# Patient Record
Sex: MALE | Race: WHITE | NOT HISPANIC OR LATINO | URBAN - METROPOLITAN AREA
[De-identification: names, ages, dates, MRNs, and addresses within clinical notes are randomized per-mention and may not be internally consistent; named-entity substitution may affect disease eponyms.]

---

## 2023-03-27 LAB — PROSTATE SPECIFIC ANTIGEN,SCREEN: 0.54 NG/ML (ref 0–4)

## 2023-04-30 ASSESSMENT — PROMIS GLOBAL HEALTH SCALE
RATE_AVERAGE_PAIN: 1
CARRYOUT_SOCIAL_ACTIVITIES: VERY GOOD
RATE_PHYSICAL_HEALTH: VERY GOOD
RATE_MENTAL_HEALTH: VERY GOOD
RATE_GENERAL_HEALTH: VERY GOOD
EMOTIONAL_PROBLEMS: RARELY
RATE_SOCIAL_SATISFACTION: VERY GOOD
RATE_QUALITY_OF_LIFE: VERY GOOD
CARRYOUT_PHYSICAL_ACTIVITIES: MOSTLY

## 2023-05-03 ASSESSMENT — ENCOUNTER SYMPTOMS
DYSPHORIC MOOD: 0
DYSURIA: 0
CHILLS: 0
SHORTNESS OF BREATH: 0
DIFFICULTY URINATING: 0
DIZZINESS: 0
ABDOMINAL PAIN: 0
FEVER: 0
NERVOUS/ANXIOUS: 0
PALPITATIONS: 0
HEADACHES: 0

## 2023-05-03 NOTE — PROGRESS NOTES
"Subjective   Patient ID: Kevon Schaefer is a 57 y.o. male who presents for Annual Exam.    HPI   Here for wellness exam.  No c/o.     Never had colonoscopy.     Sees Dr Spears yearly and has PSA done there.       Watches diet. Rides bike/roller blades several days per week. Gained some wt since last here but states it is just from this past winter since he wasn't exercise -- restarted lately and will get wt back down.     States he had tdap about 4 years ago when he got a small piece of metal in his finger.     Review of Systems   Constitutional:  Negative for chills and fever.   HENT: Negative.     Respiratory:  Negative for shortness of breath.    Cardiovascular:  Negative for chest pain and palpitations.   Gastrointestinal:  Negative for abdominal pain, blood in stool, constipation, diarrhea and vomiting.   Genitourinary:  Negative for difficulty urinating and dysuria.   Skin:  Negative for rash.   Neurological:  Negative for dizziness and headaches.   Psychiatric/Behavioral:  Negative for dysphoric mood. The patient is not nervous/anxious.      Past Medical History:   Diagnosis Date    BPH (benign prostatic hyperplasia)       Family History   Problem Relation Name Age of Onset    Breast cancer Maternal Grandmother      Coronary artery disease Maternal Grandfather        Social History     Tobacco Use    Smoking status: Never    Smokeless tobacco: Never   Substance Use Topics    Alcohol use: Never    Drug use: Never       Objective   /86   Pulse 82   Temp 36.8 °C (98.2 °F) (Temporal)   Ht 1.689 m (5' 6.5\")   Wt 77.6 kg (171 lb)   SpO2 96%   BMI 27.19 kg/m²     Physical Exam  HENT:      Head: Normocephalic.   Eyes:      General: No scleral icterus.     Extraocular Movements: Extraocular movements intact.      Pupils: Pupils are equal, round, and reactive to light.   Cardiovascular:      Rate and Rhythm: Normal rate and regular rhythm.   Pulmonary:      Effort: Pulmonary effort is normal.      Breath " sounds: Normal breath sounds.   Abdominal:      Palpations: Abdomen is soft. There is no mass.      Tenderness: There is no abdominal tenderness.   Musculoskeletal:      Cervical back: Neck supple.   Lymphadenopathy:      Cervical: No cervical adenopathy.   Skin:     General: Skin is warm and dry.   Neurological:      Mental Status: He is alert.   Psychiatric:         Mood and Affect: Affect normal.         Assessment/Plan   Diagnoses and all orders for this visit:  Routine general medical examination at a health care facility  -     Lipid Panel; Future  -     Comprehensive Metabolic Panel; Future  -     CBC and Auto Differential; Future  -     TSH with reflex to Free T4 if abnormal; Future  Colon cancer screening  -     Referral to General Surgery; Future  Weight gain  -     TSH with reflex to Free T4 if abnormal; Future       Discussed wellness issues. Discussed diet and exercise. Encouraged some wt loss.   Get fasting labs.   Referred to Dr Garcia for colonoscopy.  Continue to see urology yearly.   Discussed getting Shingrix at pharmacy.   Follow up prn.

## 2023-05-04 ENCOUNTER — OFFICE VISIT (OUTPATIENT)
Dept: PRIMARY CARE | Facility: CLINIC | Age: 58
End: 2023-05-04
Payer: COMMERCIAL

## 2023-05-04 VITALS
BODY MASS INDEX: 26.84 KG/M2 | HEART RATE: 82 BPM | DIASTOLIC BLOOD PRESSURE: 86 MMHG | HEIGHT: 67 IN | SYSTOLIC BLOOD PRESSURE: 130 MMHG | OXYGEN SATURATION: 96 % | TEMPERATURE: 98.2 F | WEIGHT: 171 LBS

## 2023-05-04 DIAGNOSIS — Z12.11 COLON CANCER SCREENING: ICD-10-CM

## 2023-05-04 DIAGNOSIS — Z00.00 ROUTINE GENERAL MEDICAL EXAMINATION AT A HEALTH CARE FACILITY: Primary | ICD-10-CM

## 2023-05-04 DIAGNOSIS — R63.5 WEIGHT GAIN: ICD-10-CM

## 2023-05-04 PROCEDURE — 99396 PREV VISIT EST AGE 40-64: CPT | Performed by: PHYSICIAN ASSISTANT

## 2023-05-04 PROCEDURE — 1036F TOBACCO NON-USER: CPT | Performed by: PHYSICIAN ASSISTANT

## 2023-05-04 RX ORDER — MULTIVITAMIN
1 TABLET ORAL DAILY
COMMUNITY

## 2023-05-04 ASSESSMENT — ENCOUNTER SYMPTOMS
DIARRHEA: 0
VOMITING: 0
BLOOD IN STOOL: 0
CONSTIPATION: 0

## 2023-05-06 ENCOUNTER — LAB (OUTPATIENT)
Dept: LAB | Facility: LAB | Age: 58
End: 2023-05-06
Payer: COMMERCIAL

## 2023-05-06 DIAGNOSIS — Z00.00 ROUTINE GENERAL MEDICAL EXAMINATION AT A HEALTH CARE FACILITY: ICD-10-CM

## 2023-05-06 DIAGNOSIS — R63.5 WEIGHT GAIN: ICD-10-CM

## 2023-05-06 LAB
ALANINE AMINOTRANSFERASE (SGPT) (U/L) IN SER/PLAS: 17 U/L (ref 10–52)
ALBUMIN (G/DL) IN SER/PLAS: 4.3 G/DL (ref 3.4–5)
ALKALINE PHOSPHATASE (U/L) IN SER/PLAS: 66 U/L (ref 33–120)
ANION GAP IN SER/PLAS: 11 MMOL/L (ref 10–20)
ASPARTATE AMINOTRANSFERASE (SGOT) (U/L) IN SER/PLAS: 17 U/L (ref 9–39)
BASOPHILS (10*3/UL) IN BLOOD BY AUTOMATED COUNT: 0.05 X10E9/L (ref 0–0.1)
BASOPHILS/100 LEUKOCYTES IN BLOOD BY AUTOMATED COUNT: 0.9 % (ref 0–2)
BILIRUBIN TOTAL (MG/DL) IN SER/PLAS: 1.4 MG/DL (ref 0–1.2)
CALCIUM (MG/DL) IN SER/PLAS: 9.2 MG/DL (ref 8.6–10.3)
CARBON DIOXIDE, TOTAL (MMOL/L) IN SER/PLAS: 29 MMOL/L (ref 21–32)
CHLORIDE (MMOL/L) IN SER/PLAS: 104 MMOL/L (ref 98–107)
CHOLESTEROL (MG/DL) IN SER/PLAS: 163 MG/DL (ref 0–199)
CHOLESTEROL IN HDL (MG/DL) IN SER/PLAS: 53.4 MG/DL
CHOLESTEROL/HDL RATIO: 3.1
CREATININE (MG/DL) IN SER/PLAS: 0.79 MG/DL (ref 0.5–1.3)
EOSINOPHILS (10*3/UL) IN BLOOD BY AUTOMATED COUNT: 0.1 X10E9/L (ref 0–0.7)
EOSINOPHILS/100 LEUKOCYTES IN BLOOD BY AUTOMATED COUNT: 1.8 % (ref 0–6)
ERYTHROCYTE DISTRIBUTION WIDTH (RATIO) BY AUTOMATED COUNT: 12.3 % (ref 11.5–14.5)
ERYTHROCYTE MEAN CORPUSCULAR HEMOGLOBIN CONCENTRATION (G/DL) BY AUTOMATED: 33 G/DL (ref 32–36)
ERYTHROCYTE MEAN CORPUSCULAR VOLUME (FL) BY AUTOMATED COUNT: 90 FL (ref 80–100)
ERYTHROCYTES (10*6/UL) IN BLOOD BY AUTOMATED COUNT: 5.21 X10E12/L (ref 4.5–5.9)
GFR MALE: >90 ML/MIN/1.73M2
GLUCOSE (MG/DL) IN SER/PLAS: 89 MG/DL (ref 74–99)
HEMATOCRIT (%) IN BLOOD BY AUTOMATED COUNT: 47 % (ref 41–52)
HEMOGLOBIN (G/DL) IN BLOOD: 15.5 G/DL (ref 13.5–17.5)
IMMATURE GRANULOCYTES/100 LEUKOCYTES IN BLOOD BY AUTOMATED COUNT: 0.2 % (ref 0–0.9)
LDL: 101 MG/DL (ref 0–99)
LEUKOCYTES (10*3/UL) IN BLOOD BY AUTOMATED COUNT: 5.6 X10E9/L (ref 4.4–11.3)
LYMPHOCYTES (10*3/UL) IN BLOOD BY AUTOMATED COUNT: 2.01 X10E9/L (ref 1.2–4.8)
LYMPHOCYTES/100 LEUKOCYTES IN BLOOD BY AUTOMATED COUNT: 36 % (ref 13–44)
MONOCYTES (10*3/UL) IN BLOOD BY AUTOMATED COUNT: 0.41 X10E9/L (ref 0.1–1)
MONOCYTES/100 LEUKOCYTES IN BLOOD BY AUTOMATED COUNT: 7.3 % (ref 2–10)
NEUTROPHILS (10*3/UL) IN BLOOD BY AUTOMATED COUNT: 3.01 X10E9/L (ref 1.2–7.7)
NEUTROPHILS/100 LEUKOCYTES IN BLOOD BY AUTOMATED COUNT: 53.8 % (ref 40–80)
PLATELETS (10*3/UL) IN BLOOD AUTOMATED COUNT: 234 X10E9/L (ref 150–450)
POTASSIUM (MMOL/L) IN SER/PLAS: 4 MMOL/L (ref 3.5–5.3)
PROTEIN TOTAL: 6.5 G/DL (ref 6.4–8.2)
SODIUM (MMOL/L) IN SER/PLAS: 140 MMOL/L (ref 136–145)
THYROTROPIN (MIU/L) IN SER/PLAS BY DETECTION LIMIT <= 0.05 MIU/L: 2.13 MIU/L (ref 0.44–3.98)
TRIGLYCERIDE (MG/DL) IN SER/PLAS: 44 MG/DL (ref 0–149)
UREA NITROGEN (MG/DL) IN SER/PLAS: 17 MG/DL (ref 6–23)
VLDL: 9 MG/DL (ref 0–40)

## 2023-05-06 PROCEDURE — 80061 LIPID PANEL: CPT

## 2023-05-06 PROCEDURE — 36415 COLL VENOUS BLD VENIPUNCTURE: CPT

## 2023-05-06 PROCEDURE — 80053 COMPREHEN METABOLIC PANEL: CPT

## 2023-05-06 PROCEDURE — 84443 ASSAY THYROID STIM HORMONE: CPT

## 2023-05-06 PROCEDURE — 85025 COMPLETE CBC W/AUTO DIFF WBC: CPT

## 2023-05-08 ENCOUNTER — TELEPHONE (OUTPATIENT)
Dept: PRIMARY CARE | Facility: CLINIC | Age: 58
End: 2023-05-08

## 2023-05-08 NOTE — TELEPHONE ENCOUNTER
----- Message from Malcolm Wilson PA-C sent at 5/8/2023  7:33 AM EDT -----  Inform patient that his labs are all okay including his cholesterol levels and his sugar level.  Thyroid level normal.

## 2023-06-06 ENCOUNTER — HOSPITAL ENCOUNTER (OUTPATIENT)
Dept: DATA CONVERSION | Facility: HOSPITAL | Age: 58
End: 2023-06-06
Attending: SURGERY | Admitting: SURGERY
Payer: COMMERCIAL

## 2023-06-06 DIAGNOSIS — D12.0 BENIGN NEOPLASM OF CECUM: ICD-10-CM

## 2023-06-06 DIAGNOSIS — Z12.11 ENCOUNTER FOR SCREENING FOR MALIGNANT NEOPLASM OF COLON: ICD-10-CM

## 2023-06-13 LAB
COMPLETE PATHOLOGY REPORT: NORMAL
CONVERTED CLINICAL DIAGNOSIS-HISTORY: NORMAL
CONVERTED FINAL DIAGNOSIS: NORMAL
CONVERTED FINAL REPORT PDF LINK TO COPY AND PASTE: NORMAL
CONVERTED GROSS DESCRIPTION: NORMAL

## 2024-03-28 ENCOUNTER — LAB (OUTPATIENT)
Dept: LAB | Facility: LAB | Age: 59
End: 2024-03-28
Payer: COMMERCIAL

## 2024-03-28 DIAGNOSIS — N40.0 BENIGN PROSTATIC HYPERPLASIA WITHOUT LOWER URINARY TRACT SYMPTOMS: Primary | ICD-10-CM

## 2024-03-28 DIAGNOSIS — Z12.5 SCREENING PSA (PROSTATE SPECIFIC ANTIGEN): Primary | ICD-10-CM

## 2024-03-28 DIAGNOSIS — N40.0 BENIGN PROSTATIC HYPERPLASIA WITHOUT LOWER URINARY TRACT SYMPTOMS: ICD-10-CM

## 2024-03-28 LAB — PSA SERPL-MCNC: 0.61 NG/ML

## 2024-03-28 PROCEDURE — 36415 COLL VENOUS BLD VENIPUNCTURE: CPT

## 2024-03-28 PROCEDURE — 84153 ASSAY OF PSA TOTAL: CPT

## 2024-04-05 ENCOUNTER — OFFICE VISIT (OUTPATIENT)
Dept: UROLOGY | Facility: CLINIC | Age: 59
End: 2024-04-05
Payer: COMMERCIAL

## 2024-04-05 DIAGNOSIS — N40.0 BENIGN PROSTATIC HYPERPLASIA WITHOUT LOWER URINARY TRACT SYMPTOMS: ICD-10-CM

## 2024-04-05 DIAGNOSIS — Z12.5 SCREENING PSA (PROSTATE SPECIFIC ANTIGEN): Primary | ICD-10-CM

## 2024-04-05 LAB
POC BILIRUBIN, URINE: NEGATIVE
POC BLOOD, URINE: NEGATIVE
POC GLUCOSE, URINE: NEGATIVE MG/DL
POC KETONES, URINE: NEGATIVE MG/DL
POC LEUKOCYTES, URINE: NEGATIVE
POC NITRITE,URINE: NEGATIVE
POC PH, URINE: 7 PH
POC PROTEIN, URINE: NEGATIVE MG/DL
POC SPECIFIC GRAVITY, URINE: 1.02
POC UROBILINOGEN, URINE: 0.2 EU/DL

## 2024-04-05 PROCEDURE — 99213 OFFICE O/P EST LOW 20 MIN: CPT | Performed by: UROLOGY

## 2024-04-05 PROCEDURE — 81003 URINALYSIS AUTO W/O SCOPE: CPT | Performed by: UROLOGY

## 2024-04-05 NOTE — PROGRESS NOTES
04/05/2024  Voiding well, nocturia 1-2    Patient has no nausea, no vomiting, no fever.    JULIAN: Deferred    PSA normal 0.61    We discussed the benign prostate hypertrophy with mild voiding symptom  We discussed PSA screening normal PSA  We discussed a yearly follow-up with PCP  All the questions were answered, the patient expressed understanding and agreed to the plan.    Impression  BPH  Nocturia  PSA check  History of epididymitis     Plan  Yearly follow-up with PCP  cut Back liquid intake  Call if problem       Chief Complaint   Patient presents with    Benign Prostatic Hypertrophy     Patient here for yearly check. Voiding well. Good urinary stream. Nocturia 1-2x; does not restrict fluid intake.         Physical Exam     TODAYS LAB RESULTS:    PSA 03/28/2024  0.61    POC Glucose, Urine  NEGATIVE mg/dl NEGATIVE   POC Bilirubin, Urine  NEGATIVE NEGATIVE   POC Ketones, Urine  NEGATIVE mg/dl NEGATIVE   POC Specific Gravity, Urine  1.005 - 1.035 1.025   POC Blood, Urine  NEGATIVE NEGATIVE   POC PH, Urine  No Reference Range Established PH 7.0   POC Protein, Urine  NEGATIVE, 30 (1+) mg/dl NEGATIVE   POC Urobilinogen, Urine  0.2, 1.0 EU/DL 0.2   Poc Nitrite, Urine  NEGATIVE NEGATIVE   POC Leukocytes, Urine  NEGATIVE NEGATIVE     ASSESSMENT&PLAN:      IMPRESSIONS:     03/31/2023  Voiding well, complaint right testicular numbness intermittently     Patient has no nausea, no vomiting, no fever.     Exam: Circumcised normal penis, testes unremarkable bilaterally, no tenderness all lump on the right     JULIAN: 1+, no nodule     Patient here today for year check of BPH, epididymoorchitis. Patient was to cutback on liquid intake after 6pm.   PSA 3/27/23 0.54  Patient states he is having numbness again in right testicle, noticed about 3 months ago. States it starts when he goes to sleep and resolves when he wakes in the morning.   Denies dysuria, burning, hematuria. Nocturia x1, but he does not stop drinking liquids until  around 10pm.   -JMorgenstern CMA      IO Glucose - Urine Negative REQUIRED    IO Bilirubin Negative REQUIRED    IO Ketones Negative REQUIRED    IO Specific Gravity 1.030 REQUIRED    IO Blood Negative REQUIRED    IO pH 5.5 REQUIRED    IO Protein, Urine Negative REQUIRED    IO Urobilinogen Normal (0.2-1.0 mg/dl) REQUIRED    IO Nitrite, Urine Negative REQUIRED    IO Leukocytes Negative      We discussed benign prostate hypertrophy with mild voiding symptoms, cut back liquid intake after 6 PM  We discussed yearly PSA check  We discussed results of exam of bilateral testes, possible scrotal ultrasound, patient declined  All the questions were answered, the patient expressed understanding and agreed to the plan.     Impression  BPH  Nocturia  PSA check  History of epididymitis     Plan  Conservative management for BPH  cut Back liquid intake  Yearly JULIAN and PSA        03/25/2022  Voiding well, nocturia x2, does not bother; no scrotal pain     Patient has no nausea, no vomiting, no fever.     JULIAN: 1+, no nodule     PSA pending     We discussed benign prostate hypertrophy with mild voiding symptom  We discussed cut back liquid intake after 6 PM  We discussed PSA screening  All the questions were answered, the patient expressed understanding and agreed to the plan.     Nocturia  PSA check     Plan  Conservative management for BPH  cut Back liquid intake after 6 PM  PSA now and in 1 year  Appointment in 1 year        03/26/2021  Voiding well, nocturia 1-2     Patient has no nausea, no vomiting, no fever.     JULIAN: Deferred     PSA  3/17/21 0.64.     IO UA (automated w/o microscopy)           26Mar2021 07:20AM          Kevon Spears     Test Name       Result     Flag        Reference  IO Glucose - Urine         Negative                  IO Bilirubin       Negative                  IO Ketones      Trace                       IO Specific Gravity         1.025                       IO Blood          Negative                  IO pH                5.5                           IO Protein, Urine            Negative                  IO Urobilinogen                                              Normal (0.2-1.0 mg/dl)  IO Nitrite, Urine              Negative                  IO Leukocytes Negative                  IO Glucose - Urine         Negative                  IO Bilirubin       Negative                  IO Ketones      Trace                       IO Specific Gravity         1.025                       IO Blood          Negative                  IO pH               5.5                           IO Protein, Urine            Negative                  IO Urobilinogen                                              Normal (0.2-1.0 mg/dl)  IO Nitrite, Urine              Negative                  IO Leukocytes Negative      We discussed benign prostate hypertrophy with mild voiding symptoms, cut back liquid intake after 6 PM  We discussed yearly PSA check  All the questions were answered, the patient expressed understanding and agreed to the plan.     Impression  BPH  Nocturia  PSA check     Plan  Conservative management for BPH  Back liquid intake  PSA in 1 year  Appointment in 1 year                                                         03/14/19  Void sWell, nocturia 1-2, does not bother     Patient has no nausea, no vomiting, no fever.     IO UA (automated w/o microscopy)           14Mar2019 10:23AM          Kevon Spears     Test Name       Result     Flag        Reference  IO Glucose - Urine         Negative                Normal  IO Bilirubin       Hemolyzed trace                 Negative  IO Ketones      Trace                     Negative  IO Specific Gravity         1.020                     1.000-1.030  IO Blood          Negative                Negative  IO pH               6.0                         5.0-8.0  IO Protein, Urine            Negative                Negative  IO Urobilinogen                                            Normal  Normal  (0.2-1.0 mg/dl)  IO Nitrite, Urine              Negative                Negative  IO Leukocytes Negative                Negative  IO Glucose - Urine         Negative                Normal  IO Bilirubin       Hemolyzed trace                 Negative  IO Ketones      Trace                     Negative  IO Specific Gravity         1.020                     1.000-1.030  IO Blood          Negative                Negative  IO pH               6.0                         5.0-8.0  IO Protein, Urine            Negative                Negative  IO Urobilinogen                                            Normal  Normal (0.2-1.0 mg/dl)  IO Nitrite, Urine              Negative                Negative  IO Leukocytes Negative                Negative                                            We discussed the benign prostate hypertrophy, urinary PSA and JULIAN; we discussed epididymal orchitis, asymptomatic.All the questions were answered, the patient expressed understanding and agreed to the plan.     Impression  BPH  Nocturia  PSA check     Plan  Conservative management for BPH  PSA in 1 year  Appointment in 1 year        02/23/2018  Left scrotal numbness for a week. Voiding fine, nocturia 1-2     Patient has no nausea, no vomiting, no fever.     Exam, normal genitalia, normal testes, normal penis     JULIAN, 1+, no nodule     IO UA (automated w/o microscopy)           [id number] 07:40AM          Kevon Spears     Test Name       Result     Flag        Reference  IO Glucose - Urine         Negative                Normal  IO Bilirubin       (+)small                 Negative  IO Ketones      (++)moderate - 40                              Negative  IO Specific Gravity         1.025                     1.000-1.030  IO Blood          Trace                     Negative  IO pH               5.0                         5.0-8.0  IO Protein, Urine            Negative                Negative  IO Urobilinogen                                             Normal  Normal (0.2-1.0 mg/dl)  IO Nitrite, Urine              Negative                Negative  IO Leukocytes Negative                Negative  IO Glucose - Urine         Negative                Normal  IO Bilirubin       (+)small                 Negative  IO Ketones      (++)moderate - 40                              Negative  IO Specific Gravity         1.025                     1.000-1.030  IO Blood          Trace                     Negative  IO pH               5.0                         5.0-8.0  IO Protein, Urine            Negative                Negative  IO Urobilinogen                                            Normal  Normal (0.2-1.0 mg/dl)  IO Nitrite, Urine              Negative                Negative  IO Leukocytes Negative                Negative     We discussed scrotal numbness sensation with normal physical exam, urine analysis, benign prostate hypertrophy with a mild symptoms etc.All the questions were answered, the patient expressed understanding and agreed to the plan.     Impression  Left scrotal numbness  Epididymal orchitis  BPH     Plan  Conservative management  Assurance  Cipro 500 mg twice a day for 1 week  Call back if symptoms persist up to one month  Yearly JULIAN and appointment     I spent 25 minutes with this patient. Greater than 50% of this time was spent in counseling and/or coordination of care                                               3/28/16 large prostate HBM     3/26/15 large prostateHBM      PSA  03/28/2024  0.61  3/27/23 0.54   3/17/21 0.64.